# Patient Record
Sex: MALE | Race: WHITE | Employment: UNEMPLOYED | URBAN - METROPOLITAN AREA
[De-identification: names, ages, dates, MRNs, and addresses within clinical notes are randomized per-mention and may not be internally consistent; named-entity substitution may affect disease eponyms.]

---

## 2017-01-18 ENCOUNTER — TELEPHONE (OUTPATIENT)
Dept: ORTHOPEDIC SURGERY | Age: 27
End: 2017-01-18

## 2017-01-18 DIAGNOSIS — S83.512D RUPTURE OF ANTERIOR CRUCIATE LIGAMENT OF LEFT KNEE, SUBSEQUENT ENCOUNTER: Primary | ICD-10-CM

## 2017-01-18 NOTE — TELEPHONE ENCOUNTER
Please place order and send to [facility] for Custom ACL brace. Please call patient and let him know that this has been done.

## 2019-01-29 ENCOUNTER — OFFICE VISIT (OUTPATIENT)
Dept: INTERNAL MEDICINE CLINIC | Age: 29
End: 2019-01-29

## 2019-01-29 VITALS
TEMPERATURE: 97.9 F | BODY MASS INDEX: 29.52 KG/M2 | HEART RATE: 58 BPM | OXYGEN SATURATION: 97 % | DIASTOLIC BLOOD PRESSURE: 80 MMHG | SYSTOLIC BLOOD PRESSURE: 119 MMHG | HEIGHT: 74 IN | RESPIRATION RATE: 18 BRPM | WEIGHT: 230 LBS

## 2019-01-29 DIAGNOSIS — B35.1 ONYCHOMYCOSIS OF TOENAIL: Primary | ICD-10-CM

## 2019-01-29 DIAGNOSIS — H61.23 BILATERAL IMPACTED CERUMEN: ICD-10-CM

## 2019-01-29 RX ORDER — NEOMYCIN SULFATE, POLYMYXIN B SULFATE AND HYDROCORTISONE 10; 3.5; 1 MG/ML; MG/ML; [USP'U]/ML
3 SUSPENSION/ DROPS AURICULAR (OTIC) 2 TIMES DAILY
Qty: 10 ML | Refills: 1 | Status: SHIPPED | OUTPATIENT
Start: 2019-01-29

## 2019-01-29 RX ORDER — NEOMYCIN SULFATE, POLYMYXIN B SULFATE AND HYDROCORTISONE 10; 3.5; 1 MG/ML; MG/ML; [USP'U]/ML
3 SUSPENSION/ DROPS AURICULAR (OTIC) 2 TIMES DAILY
Qty: 10 ML | Refills: 1 | Status: SHIPPED | OUTPATIENT
Start: 2019-01-29 | End: 2019-01-29 | Stop reason: SDUPTHER

## 2019-01-29 NOTE — PROGRESS NOTES
Chief Complaint Patient presents with  Ear Fullness Ears full, decreased hearing. Father and grandfather with hearing loss.  Nail Problem  
  right great toenail broken, cracked 2 weeks ago and does not seem to be growing back. [employer]ays [employer], travel team.  
 
1. Have you been to the ER, urgent care clinic since your last visit? Hospitalized since your last visit? No 
 
2. Have you seen or consulted any other health care providers outside of the [address] [place] [address] since your last visit? Include any pap smears or colon screening. No  
PHQ over the last two weeks 1/29/2019 Little interest or pleasure in doing things Not at all Feeling down, depressed, irritable, or hopeless Not at all Total Score PHQ 2 0

## 2019-01-31 NOTE — PROGRESS NOTES
HISTORY OF PRESENT ILLNESS 
Kenneth Coronel is a 29 y.o. male. HPI Pt c/o bilat decreased hearing. He has a hx of cerumen impaction and feels that has happened again. Denies otalgia, tinnitus, drainage, fever, chills, HA, and dizziness. He is also concerned about his right great toenail. It split yesterday and has turned a whitish yellow. No Known Allergies Current Outpatient Medications Medication Sig  
 neomycin-polymyxin-hydrocortisone, buffered, (PEDIOTIC) 3.5-10,000-1 mg/mL-unit/mL-% otic suspension Administer 3 Drops into each ear two (2) times a day.  ibuprofen (MOTRIN) 200 mg tablet Take  by mouth.  ibuprofen (MOTRIN) 800 mg tablet Take 1 Tab by mouth three (3) times daily (with meals).  Cjbplih-Cvgennbnctl-AG-Acetam (NYQUIL D) 6.25-30- mg/15 mL liqd Take  by mouth.  phenylephrine-dm-acetaminophen (VICKS DAYQUIL) 5- mg cap Take  by mouth. No current facility-administered medications for this visit. Review of Systems Constitutional: Negative. Negative for chills, fever and malaise/fatigue. HENT: Positive for hearing loss (bilat). Negative for congestion, ear pain, sore throat and tinnitus. Eyes: Negative. Negative for blurred vision, double vision and photophobia. Respiratory: Negative. Negative for cough, shortness of breath and wheezing. Cardiovascular: Negative. Negative for chest pain, palpitations and leg swelling. Gastrointestinal: Negative. Negative for abdominal pain, heartburn, nausea and vomiting. Genitourinary: Negative. Negative for dysuria, frequency, hematuria and urgency. Musculoskeletal: Negative. Negative for back pain, joint pain, myalgias and neck pain. Skin: Negative for itching and rash. Right great toenail splitting Neurological: Negative. Negative for dizziness, tingling, tremors and headaches. Psychiatric/Behavioral: Negative.   Negative for depression and memory loss. The patient is not nervous/anxious and does not have insomnia. Visit Vitals /80 (BP 1 Location: Left arm, BP Patient Position: Sitting) Pulse (!) 58 Temp 97.9 °F (36.6 °C) (Oral) Resp 18 Ht 6' 2\" (1.88 m) Wt 230 lb (104.3 kg) SpO2 97% BMI 29.53 kg/m² Physical Exam  
Constitutional: He is oriented to person, place, and time. He appears well-developed and well-nourished. No distress. HENT:  
Head: Normocephalic and atraumatic. Right Ear: Tympanic membrane normal. There is swelling. Left Ear: Tympanic membrane normal. There is swelling. bilat impacted cerumen worse on right - TMs viewed post irrigation and intact. Canal erythematous and minimal bleeding following irrigation. Cardiovascular: Normal rate, regular rhythm and normal heart sounds. Pulmonary/Chest: Effort normal and breath sounds normal.  
Neurological: He is alert and oriented to person, place, and time. Skin: He is not diaphoretic. Right great toenail yellow and splitting Psychiatric: He has a normal mood and affect. His behavior is normal.  
 
 
ASSESSMENT and PLAN 
  ICD-10-CM ICD-9-CM 1. Onychomycosis of toenail B35.1 110.1 2. Bilateral impacted cerumen H61.23 380.4 REMOVAL IMPACTED CERUMEN IRRIGATION/LVG UNILAT  
   neomycin-polymyxin-hydrocortisone, buffered, (PEDIOTIC) 3.5-10,000-1 mg/mL-unit/mL-% otic suspension Follow-up Disposition: 
Return if symptoms worsen or fail to improve. Pt expressed understanding of visit summary and plans for any follow ups or referrals as well as any medications prescribed.

## 2019-01-31 NOTE — PATIENT INSTRUCTIONS
Earwax Blockage: Care Instructions Your Care Instructions Earwax is a natural substance that protects the ear canal. Normally, earwax drains from the ears and does not cause problems. Sometimes earwax builds up and hardens. Earwax blockage (also called cerumen impaction) can cause some loss of hearing and pain. When wax is tightly packed, you will need to have your doctor remove it. Follow-up care is a key part of your treatment and safety. Be sure to make and go to all appointments, and call your doctor if you are having problems. It's also a good idea to know your test results and keep a list of the medicines you take. How can you care for yourself at home? · Do not try to remove earwax with cotton swabs, fingers, or other objects. This can make the blockage worse and damage the eardrum. · If your doctor recommends that you try to remove earwax at home: ? Soften and loosen the earwax with warm mineral oil. You also can try hydrogen peroxide mixed with an equal amount of room temperature water. Place 2 drops of the fluid, warmed to body temperature, in the ear two times a day for up to 5 days. ? Once the wax is loose and soft, all that is usually needed to remove it from the ear canal is a gentle, warm shower. Direct the water into the ear, then tip your head to let the earwax drain out. Dry your ear thoroughly with a hair dryer set on low. Hold the dryer several inches from your ear. ? If the warm mineral oil and shower do not work, use an over-the-counter wax softener. Read and follow all instructions on the label. After using the wax softener, use an ear syringe to gently flush the ear. Make sure the flushing solution is body temperature. Cool or hot fluids in the ear can cause dizziness. When should you call for help? Call your doctor now or seek immediate medical care if: 
  · Pus or blood drains from your ear.  
  · Your ears are ringing or feel full.  
  · You have a loss of hearing.  Watch closely for changes in your health, and be sure to contact your doctor if: 
  · You have pain or reduced hearing after 1 week of home treatment.  
  · You have any new symptoms, such as nausea or balance problems. Where can you learn more? Go to http://nita-alcon.info/. Enter U975 in the search box to learn more about \"Earwax Blockage: Care Instructions. \" Current as of: September 23, 2018 Content Version: 11.9 © 3971-1170 Signature. Care instructions adapted under license by [employer] (which disclaims liability or warranty for this information). If you have questions about a medical condition or this instruction, always ask your healthcare professional. [address] any warranty or liability for your use of this information.

## 2020-08-06 ENCOUNTER — OFFICE VISIT (OUTPATIENT)
Dept: ORTHOPEDIC SURGERY | Age: 30
End: 2020-08-06

## 2020-08-06 VITALS
TEMPERATURE: 97.3 F | BODY MASS INDEX: 30.03 KG/M2 | HEART RATE: 64 BPM | RESPIRATION RATE: 18 BRPM | HEIGHT: 74 IN | DIASTOLIC BLOOD PRESSURE: 67 MMHG | WEIGHT: 234 LBS | SYSTOLIC BLOOD PRESSURE: 117 MMHG

## 2020-08-06 DIAGNOSIS — M25.562 ACUTE PAIN OF LEFT KNEE: Primary | ICD-10-CM

## 2020-08-06 DIAGNOSIS — M17.12 PRIMARY OSTEOARTHRITIS OF LEFT KNEE: ICD-10-CM

## 2020-08-06 DIAGNOSIS — S83.242A ACUTE MEDIAL MENISCUS TEAR OF LEFT KNEE, INITIAL ENCOUNTER: ICD-10-CM

## 2020-08-06 RX ORDER — ACETAMINOPHEN 325 MG/1
TABLET ORAL
COMMUNITY

## 2020-08-06 RX ORDER — TRIAMCINOLONE ACETONIDE 40 MG/ML
40 INJECTION, SUSPENSION INTRA-ARTICULAR; INTRAMUSCULAR ONCE
Qty: 1 ML | Refills: 0
Start: 2020-08-06 | End: 2020-08-06

## 2020-08-06 NOTE — PROGRESS NOTES
Rosalind Fraire  1990   Chief Complaint   Patient presents with    Knee Pain     Left knee pain        HISTORY OF PRESENT ILLNESS  Rosalind Fraire is a 27 y.o. male who presents today for evaluation of left knee pain. He is s/p medial capsule repair and ACL reconstruction in 2014. He notes 2 weeks ago he noted extreme swelling and pain in his knee. Pain limited him from walking and was very difficult to move. He has been stretching and working on mobility. Still has a catching sensation when walking. Still limping when walking. Pain is a 4/10. No relief with nsaids or tylenol. Pain Assessment  8/6/2020   Location of Pain -   Location Modifiers -   Severity of Pain 4   Quality of Pain Other (Comment)   Quality of Pain Comment shooting pain and pull   Duration of Pain Persistent   Frequency of Pain Constant   Date Pain First Started 7/23/2020   Aggravating Factors Other (Comment); Walking   Aggravating Factors Comment straighting   Limiting Behavior Yes   Relieving Factors Other (Comment)   Relieving Factors Comment slight bend in the knee   Result of Injury No            No Known Allergies     History reviewed. No pertinent past medical history. Social History     Socioeconomic History    Marital status: SINGLE     Spouse name: Not on file    Number of children: Not on file    Years of education: Not on file    Highest education level: Not on file   Occupational History    Not on file   Social Needs    Financial resource strain: Not on file    Food insecurity     Worry: Not on file     Inability: Not on file    Transportation needs     Medical: Not on file     Non-medical: Not on file   Tobacco Use    Smoking status: Never Smoker    Smokeless tobacco: Never Used   Substance and Sexual Activity    Alcohol use:  Yes     Alcohol/week: 0.0 standard drinks    Drug use: No    Sexual activity: Not on file   Lifestyle    Physical activity     Days per week: Not on file     Minutes per session: Not on file  Stress: Not on file   Relationships    Social connections     Talks on phone: Not on file     Gets together: Not on file     Attends Church service: Not on file     Active member of club or organization: Not on file     Attends meetings of clubs or organizations: Not on file     Relationship status: Not on file    Intimate partner violence     Fear of current or ex partner: Not on file     Emotionally abused: Not on file     Physically abused: Not on file     Forced sexual activity: Not on file   Other Topics Concern    Not on file   Social History Narrative    Not on file      Past Surgical History:   Procedure Laterality Date    HX KNEE ARTHROSCOPY        Family History   Problem Relation Age of Onset    No Known Problems Mother     No Known Problems Father     No Known Problems Brother       Current Outpatient Medications   Medication Sig    acetaminophen (TylenoL) 325 mg tablet Take  by mouth every four (4) hours as needed for Pain.  triamcinolone acetonide (Kenalog) 40 mg/mL injection 1 mL by Intra artICUlar route once for 1 dose.  neomycin-polymyxin-hydrocortisone, buffered, (PEDIOTIC) 3.5-10,000-1 mg/mL-unit/mL-% otic suspension Administer 3 Drops into each ear two (2) times a day.  ibuprofen (MOTRIN) 800 mg tablet Take 1 Tab by mouth three (3) times daily (with meals).  Onjklgw-Rturmduugaj-RN-Acetam (NYQUIL D) 6.25-30- mg/15 mL liqd Take  by mouth.  phenylephrine-dm-acetaminophen (VICKS DAYQUIL) 5- mg cap Take  by mouth.  ibuprofen (MOTRIN) 200 mg tablet Take  by mouth. No current facility-administered medications for this visit. REVIEW OF SYSTEM   Patient denies: Weight loss, Fever/Chills, HA, Visual changes, Fatigue, Chest pain, SOB, Abdominal pain, N/V/D/C, Blood in stool or urine, Edema. Pertinent positive as above in HPI.  All others were negative    PHYSICAL EXAM:   Visit Vitals  /67 (BP 1 Location: Right arm, BP Patient Position: Sitting) Pulse 64   Temp 97.3 °F (36.3 °C)   Resp 18   Ht 6' 2\" (1.88 m)   Wt 234 lb (106.1 kg)   BMI 30.04 kg/m²     The patient is a well-developed, well-nourished male   in no acute distress. The patient is alert and oriented times three. The patient is alert and oriented times three. Mood and affect are normal.  LYMPHATIC: lymph nodes are not enlarged and are within normal limits  SKIN: normal in color and non tender to palpation. There are no bruises or abrasions noted. NEUROLOGICAL: Motor sensory exam is within normal limits. Reflexes are equal bilaterally. There is normal sensation to pinprick and light touch  MUSCULOSKELETAL:  Examination Left knee   Skin Intact   Range of motion 5-115   Effusion +   Medial joint line tenderness +   Lateral joint line tenderness -   Tenderness Pes Bursa -   Tenderness insertion MCL -   Tenderness insertion LCL -   Roms +   Patella crepitus -   Patella grind -   Lachman -   Pivot shift -   Anterior drawer -   Posterior drawer -   Varus stress -   Valgus stress -   Neurovascular Intact   Calf Swelling and Tenderness to Palpation -   Mecca's Test -   Hamstring Cord Tightness +       IMAGIN view xray images of left knee taken in office on 2020 read and reviewed by myself reveal degenerative changes in medial and patella femoral compartment. ACL tunnel with anchor in place    PROCEDURE: Left knee Injection    Indication:Left knee pain/swelling    After sterile prep, 4 cc of Xylocaine and 1 cc of Kenalog were injected into the left knee.        [facility] - [facility]  OFFICE PROCEDURE PROGRESS NOTE        Chart reviewed for the following:  Gaye REID PA, have reviewed the History, Physical and updated the Allergic reactions for [address] performed immediately prior to start of procedure:  Gaye REID PA-C, have performed the following reviews on Rosalind Fraire prior to the start of the procedure:            * Patient was identified by name and date of birth   * Agreement on procedure being performed was verified  * Risks and Benefits explained to the patient  * Procedure site verified and marked as necessary  * Patient was positioned for comfort  * Consent was signed and verified     Time: 10:41 AM    Date of procedure: 8/6/2020    Procedure performed by:  NIKO Fierro    Provider assisted by: (see medication administration)    How tolerated by patient: tolerated the procedure well with no complications    Comments: none        IMPRESSION:      ICD-10-CM ICD-9-CM    1. Acute pain of left knee  M25.562 719.46 AMB POC XRAY, KNEE; 1/2 VIEWS   2. Primary osteoarthritis of left knee  M17.12 715.16 TRIAMCINOLONE ACETONIDE INJ      triamcinolone acetonide (Kenalog) 40 mg/mL injection      DRAIN/INJECT LARGE JOINT/BURSA   3. Acute medial meniscus tear of left knee, initial encounter  S83.242A 836.0 MRI KNEE LT WO CONT        PLAN:   1. Patient with acute knee pain. Ddx: degen arthritis acute exacerbation vs MMT. Will inject today   Risk factors include: n/a  2. Yes cortisone injection indicated today leftknee  3. No Physical/Occupational Therapy indicated today  4. Yes diagnostic test indicated today: MRI left knee r/o MMT  5. No durable medical equipment indicated today  6. No referral indicated today   7. No medications indicated today:   8.  No Narcotic indicated today      RTC after MRI     [facility] [address] [facility]

## 2020-08-11 ENCOUNTER — DOCUMENTATION ONLY (OUTPATIENT)
Dept: ORTHOPEDIC SURGERY | Age: 30
End: 2020-08-11

## 2020-08-11 NOTE — PROGRESS NOTES
Patients order for MRI was re-faxed to [facility] diagnostic again today 08/11/20. I called and advised facility I had faxed it for the 2nd time, they stated they will go look for it on the fax machine [phone] and call patient.

## 2020-08-17 DIAGNOSIS — S83.242A ACUTE MEDIAL MENISCUS TEAR OF LEFT KNEE, INITIAL ENCOUNTER: ICD-10-CM

## 2020-08-19 ENCOUNTER — OFFICE VISIT (OUTPATIENT)
Dept: ORTHOPEDIC SURGERY | Age: 30
End: 2020-08-19

## 2020-08-19 VITALS
DIASTOLIC BLOOD PRESSURE: 69 MMHG | SYSTOLIC BLOOD PRESSURE: 119 MMHG | TEMPERATURE: 98.6 F | HEART RATE: 78 BPM | HEIGHT: 74 IN | BODY MASS INDEX: 29.57 KG/M2 | WEIGHT: 230.4 LBS | RESPIRATION RATE: 14 BRPM | OXYGEN SATURATION: 97 %

## 2020-08-19 DIAGNOSIS — S83.242D ACUTE MEDIAL MENISCUS TEAR OF LEFT KNEE, SUBSEQUENT ENCOUNTER: Primary | ICD-10-CM

## 2020-08-19 NOTE — PROGRESS NOTES
Billie Morgan  1990   Chief Complaint   Patient presents with    Knee Pain     Left        HISTORY OF PRESENT ILLNESS  Billie Morgan is a 27 y.o. male who presents today for evaluation of left knee and MRI review. He is s/p medial capsule repair and ACL reconstruction in 2014. Pt rates pain as 2/10 today. He notes that almost 4 weeks ago he noted extreme swelling and pain in his knee. Pain limited him from walking and was very difficult to move. At last OV, patient had a cortisone injection which provided some relief. He was able to walk without a limp around 1 week after the injection. He is now able to stretch his hamstrings. Started bearing weight while exercising yesterday. His pain has overall improved but still notes some pain with jumping motions such as with playing volleyball. Has tried taking NSAIDs and Tylenol. Patient denies any fever, chills, chest pain, shortness of breath or calf pain. The remainder of the review of systems is negative. There are no new illness or injuries to report since last seen in the office. There are no changes to medications, allergies, family or social history. Pain Assessment  8/19/2020   Location of Pain Knee   Location Modifiers Left   Severity of Pain 2   Quality of Pain Aching; Sharp   Quality of Pain Comment -   Duration of Pain Persistent   Frequency of Pain Constant   Date Pain First Started -   Aggravating Factors Other (Comment); Squatting   Aggravating Factors Comment jumping   Limiting Behavior No   Relieving Factors Exercises;Ice;Rest   Relieving Factors Comment -   Result of Injury No            No Known Allergies     History reviewed. No pertinent past medical history.    Social History     Socioeconomic History    Marital status: SINGLE     Spouse name: Not on file    Number of children: Not on file    Years of education: Not on file    Highest education level: Not on file   Occupational History    Not on file   Social Needs    Financial resource strain: Not on file    Food insecurity     Worry: Not on file     Inability: Not on file    Transportation needs     Medical: Not on file     Non-medical: Not on file   Tobacco Use    Smoking status: Never Smoker    Smokeless tobacco: Never Used   Substance and Sexual Activity    Alcohol use: Yes     Alcohol/week: 0.0 standard drinks    Drug use: No    Sexual activity: Not on file   Lifestyle    Physical activity     Days per week: Not on file     Minutes per session: Not on file    Stress: Not on file   Relationships    Social connections     Talks on phone: Not on file     Gets together: Not on file     Attends Jain service: Not on file     Active member of club or organization: Not on file     Attends meetings of clubs or organizations: Not on file     Relationship status: Not on file    Intimate partner violence     Fear of current or ex partner: Not on file     Emotionally abused: Not on file     Physically abused: Not on file     Forced sexual activity: Not on file   Other Topics Concern    Not on file   Social History Narrative    Not on file      Past Surgical History:   Procedure Laterality Date    HX KNEE ARTHROSCOPY        Family History   Problem Relation Age of Onset    No Known Problems Mother     No Known Problems Father     No Known Problems Brother       Current Outpatient Medications   Medication Sig    acetaminophen (TylenoL) 325 mg tablet Take  by mouth every four (4) hours as needed for Pain.  neomycin-polymyxin-hydrocortisone, buffered, (PEDIOTIC) 3.5-10,000-1 mg/mL-unit/mL-% otic suspension Administer 3 Drops into each ear two (2) times a day.  ibuprofen (MOTRIN) 800 mg tablet Take 1 Tab by mouth three (3) times daily (with meals).  Zkaxcoh-Dkrrpvrmvyo-AW-Acetam (NYQUIL D) 6.25-30- mg/15 mL liqd Take  by mouth.  phenylephrine-dm-acetaminophen (VICKS DAYQUIL) 5- mg cap Take  by mouth.  ibuprofen (MOTRIN) 200 mg tablet Take  by mouth. No current facility-administered medications for this visit. REVIEW OF SYSTEM   Patient denies: Weight loss, Fever/Chills, HA, Visual changes, Fatigue, Chest pain, SOB, Abdominal pain, N/V/D/C, Blood in stool or urine, Edema. Pertinent positive as above in HPI. All others were negative    PHYSICAL EXAM:   Visit Vitals  /69 (BP 1 Location: Right arm)   Pulse 78   Temp 98.6 °F (37 °C) (Temporal)   Resp 14   Ht 6' 2\" (1.88 m)   Wt 230 lb 6.4 oz (104.5 kg)   SpO2 97%   BMI 29.58 kg/m²     The patient is a well-developed, well-nourished male   in no acute distress. The patient is alert and oriented times three. The patient is alert and oriented times three. Mood and affect are normal.  LYMPHATIC: lymph nodes are not enlarged and are within normal limits  SKIN: normal in color and non tender to palpation. There are no bruises or abrasions noted. NEUROLOGICAL: Motor sensory exam is within normal limits. Reflexes are equal bilaterally. There is normal sensation to pinprick and light touch  MUSCULOSKELETAL:  Examination Left knee   Skin Intact   Range of motion 5-115   Effusion +   Medial joint line tenderness +   Lateral joint line tenderness -   Tenderness Pes Bursa -   Tenderness insertion MCL -   Tenderness insertion LCL -   Roms +   Patella crepitus -   Patella grind -   Lachman -   Pivot shift -   Anterior drawer -   Posterior drawer -   Varus stress -   Valgus stress -   Neurovascular Intact   Calf Swelling and Tenderness to Palpation -   Mecca's Test -   Hamstring Cord Tightness +       IMAGING: MRI of left knee dated 8/16/2020 was reviewed and read by Dr. Ravi Boggs:   IMPRESSION:  1. Small oblique radial tear at the posterior horn medial meniscus, which appears new compared to left knee MRI dated 12/20/2016. Separate small horizontal tear extending to the interior surface at the posterior horn medial meniscus, unchanged.  Mildly progressive mild to moderate medial compartment osteoarthritis with medial femoral condyle delaminating articular cartilage tear. 2. Postsurgical change at the posterior horn lateral meniscus. No lateral meniscus tear is identified. Mildly progressive mild to moderate lateral compartment osteoarthritis. 3. Mildly progressive mild to moderate patellofemoral compartment osteoarthritis with small patellar delaminating articular cartilage tears. 4. Intact ACL graft. 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 5. Mild semi-membranosus tendinosis and mild semi-membranosus bursitis  6. Intact MCL/medial meniscal capsular junction repair. 7. Small joint effusion      2 view xray images of left knee taken in office on 8/06/2020 read and reviewed by Caryle Aus, PA, reveal: degenerative changes in medial and patella femoral compartment. ACL tunnel with anchor in place        IMPRESSION:      ICD-10-CM ICD-9-CM    1. Acute medial meniscus tear of left knee, subsequent encounter  S83.242D V58.89      836.0         PLAN:   1. Patient presents with an MRI-documented left knee medial meniscus tear. He states his pain has improved since last OV and has been able to resume with most of his activities. I advised him to use pain as his guide and he can return as needed. Risk factors include: n/a  2. No cortisone injection indicated today   3. No Physical/Occupational Therapy indicated today  4. No diagnostic test indicated today  5. No durable medical equipment indicated today  6. No referral indicated today   7. No medications indicated today:   8. No Narcotic indicated today      RTC prn      Scribed by [address]) as dictated by Di Landeros MD    I, Dr. Di Landeros, confirm that all documentation is accurate.     Di Landeros M.D.   [facility]

## 2020-08-28 ENCOUNTER — APPOINTMENT (OUTPATIENT)
Dept: PHYSICAL THERAPY | Age: 30
End: 2020-08-28

## 2020-10-30 ENCOUNTER — HOSPITAL ENCOUNTER (OUTPATIENT)
Dept: PHYSICAL THERAPY | Age: 30
Discharge: HOME | End: 2020-10-30
Payer: COMMERCIAL

## 2020-10-30 PROCEDURE — 97161 PT EVAL LOW COMPLEX 20 MIN: CPT | Performed by: PHYSICAL THERAPIST

## 2020-10-30 PROCEDURE — 97140 MANUAL THERAPY 1/> REGIONS: CPT | Performed by: PHYSICAL THERAPIST

## 2020-10-30 NOTE — PROGRESS NOTES
[address] [facility] PHYSICAL THERAPY  [address] [place] [address] [address],[place], [address] [facility] [address] - Phone: [phone]  Fax: [phone] / [phone] [facility]  Patient Name: Abril Hunt : 1990   Medical   Diagnosis: L knee arthroscopy Treatment Diagnosis: Left knee pain [M25.562]   Onset Date: 10/27/20     Referral Source: [facility] [place], operated by [facility]): 10/30/2020   Prior Hospitalization: See medical history Provider #: [id number]   Prior Level of Function: Pain with recreational activities   Comorbidities: L ACL/MMR/LMR (), arthritis, alcohol use   Medications: Verified on Patient Summary List   The Plan of Care and following information is based on the information from the initial evaluation.   ===========================================================================================  Assessment / key information:  27 M arrives to clinic s/p L knee arthroscopy. Pt had a previous ACL/MMR/LMR 7 years ago but reports inability to obtain full extension and return to full recreational actities. Currently 1-9/10, ambulates with stiff legged gait pattern but without AD or progressive pain, arom -5-88. Incisions normal healing rate post op. +gastroc/hs tightness, reduced quad, glute strength.  Will attempt PT in order to address problem list below  ===========================================================================================  Eval Complexity: History MEDIUM  Complexity : 1-2 comorbidities / personal factors will impact the outcome/ POC ;  Examination  MEDIUM Complexity : 3 Standardized tests and measures addressing body structure, function, activity limitation and / or participation in recreation ; Presentation LOW Complexity : Stable, uncomplicated ;  Decision Making MEDIUM Complexity : FOTO score of 26-74; Overall Complexity LOW   Problem List: pain affecting function, decrease ROM, decrease strength, edema affecting function, impaired gait/ balance, decrease ADL/ functional abilitiies, decrease activity tolerance, decrease flexibility/ joint mobility and decrease transfer abilities   Treatment Plan may include any combination of the following: Therapeutic exercise, Therapeutic activities, Neuromuscular re-education, Physical agent/modality, Gait/balance training, Manual therapy, Patient education, Self Care training, Functional mobility training, Home safety training and Stair training  Patient / Family readiness to learn indicated by: asking questions  Persons(s) to be included in education: patient (P)  Barriers to Learning/Limitations: None  Measures taken, if barriers to learning:    Patient Goal (s): Improve mobility   Patient self reported health status: good  Rehabilitation Potential: good   Short Term Goals: To be accomplished in  2  weeks:  1. Compliant with hep  2. Increase knee arom >110 to A with squatting  3. Note daily max pain </=6/10 in order to increase participation in  [place]: To be accomplished in  4  weeks:  1. Pt will obtain full knee extension in order to normalize gait pattern  2. Increase FOTO by 20 points in order to show functional improvement  3. Note daily max pain </=4/10 in order to increase participation   Frequency / Duration:   Patient to be seen  2  times per week for 4  weeks:  Patient / Caregiver education and instruction: self care, activity modification and exercises  Therapist Signature: Kp Noland PT Date: 05/40/4310   Certification Period: na Time: 8:20 AM   ===========================================================================================  I certify that the above Physical Therapy Services are being furnished while the patient is under my care. I agree with the treatment plan and certify that this therapy is necessary.     Physician Signature:        Date:       Time:     Please sign and return to [facility] at [facility] [place], [place], [facility]. or you may fax the signed copy to [phone]. Thank you.

## 2020-10-30 NOTE — PROGRESS NOTES
[facility] PHYSICAL THERAPY - DAILY TREATMENT NOTE    Patient Name: Cee Quesada        Date: 10/30/2020  : 1990   yes Patient  Verified  Visit #:     Insurance: Payor: Rosa Gan / Plan: [address] PT / Product Type: Commerical /      In time: 730 Out time: 815   Total Treatment Time: 45     Medicare/BCBS Time Tracking (below)   Total Timed Codes (min):  na 1:1 Treatment Time:  na     TREATMENT AREA =  Left knee pain [M25.562]    SUBJECTIVE  Pain Level (on 0 to 10 scale):  0  / 10   Medication Changes/New allergies or changes in medical history, any new surgeries or procedures?    no  If yes, update Summary List   Subjective Functional Status/Changes:  []  No changes reported     See ie          OBJECTIVE  Modalities Rationale:     decrease inflammation, decrease pain and increase tissue extensibility to improve patient's ability to complete adls   min [] Estim, type/location:                                      []  att     []  unatt     []  w/US     []  w/ice    []  w/heat    min []  Mechanical Traction: type/lbs                   []  pro   []  sup   []  int   []  cont    []  before manual    []  after manual    min []  Ultrasound, settings/location:      min []  Iontophoresis w/ dexamethasone, location:                                               []  take home patch       []  in clinic   10 min [x]  Ice     []  Heat    location/position: Supine with bolster     min []  Vasopneumatic Device, press/temp:     min []  Other:    [x] Skin assessment post-treatment (if applicable):    [x]  intact    []  redness- no adverse reaction     []redness  adverse reaction:        10 min Therapeutic Exercise:  [x]  See flow sheet   Rationale:      increase ROM and increase strength to improve the patients ability to complete adl     15 min Manual Therapy: Knee prom, op, pat mobs   Rationale:      decrease pain, increase ROM, increase tissue extensibility and decrease trigger points to improve patient's ability to complete adls  The manual therapy interventions were performed at a separate and distinct time from the therapeutic activities interventions. Billed With/As:   [] TE   [] TA   [] Neuro   [] Self Care Patient Education: [x] Review HEP    [] Progressed/Changed HEP based on:   [] positioning   [] body mechanics   [] transfers   [] heat/ice application    [] other:      Other Objective/Functional Measures:    No increase in pain following session  See ie     Post Treatment Pain Level (on 0 to 10) scale:   0  / 10     ASSESSMENT  Assessment/Changes in Function:     See ie     []  See Progress Note/Recertification   Patient will continue to benefit from skilled PT services to modify and progress therapeutic interventions, address functional mobility deficits, address ROM deficits, address strength deficits, analyze and address soft tissue restrictions, analyze and cue movement patterns, analyze and modify body mechanics/ergonomics, assess and modify postural abnormalities, address imbalance/dizziness and instruct in home and community integration to attain remaining goals. Progress toward goals / Updated goals:    See ie     PLAN  []  Upgrade activities as tolerated yes Continue plan of care   []  Discharge due to :    []  Other:      Therapist: Alvaro Quiroz PT    Date: 10/30/2020 Time: 8:12 AM     No future appointments.

## 2020-11-03 ENCOUNTER — HOSPITAL ENCOUNTER (OUTPATIENT)
Dept: PHYSICAL THERAPY | Age: 30
Discharge: HOME | End: 2020-11-03
Payer: COMMERCIAL

## 2020-11-03 PROCEDURE — 97110 THERAPEUTIC EXERCISES: CPT | Performed by: PHYSICAL THERAPIST

## 2020-11-03 PROCEDURE — 97140 MANUAL THERAPY 1/> REGIONS: CPT | Performed by: PHYSICAL THERAPIST

## 2020-11-03 NOTE — PROGRESS NOTES
Alayna Doyle PHYSICAL THERAPY - DAILY TREATMENT NOTE    Patient Name: Reta Nagy        Date: 11/3/2020  : 1990   yes Patient  Verified  Visit #:     Insurance: Payor: Kenna Mail / Plan: [address] PT / Product Type: Commerical /      In time: 1010 Out time: 1120   Total Treatment Time: 65     Medicare/BCBS Time Tracking (below)   Total Timed Codes (min):  na 1:1 Treatment Time:  na     TREATMENT AREA =  Left knee pain [M25.562]    SUBJECTIVE  Pain Level (on 0 to 10 scale):  0  / 10   Medication Changes/New allergies or changes in medical history, any new surgeries or procedures?    no  If yes, update Summary List   Subjective Functional Status/Changes:  []  No changes reported     I do have some discomfort on the inside part of my knee.            OBJECTIVE  Modalities Rationale:     decrease inflammation, decrease pain and increase tissue extensibility to improve patient's ability to complete adls    min [] Estim, type/location:                                      []  att     []  unatt     []  w/US     []  w/ice    []  w/heat    min []  Mechanical Traction: type/lbs                   []  pro   []  sup   []  int   []  cont    []  before manual    []  after manual    min []  Ultrasound, settings/location:      min []  Iontophoresis w/ dexamethasone, location:                                               []  take home patch       []  in clinic   10 min [x]  Ice     []  Heat    location/position: Supine with bolster     min []  Vasopneumatic Device, press/temp:     min []  Other:    [x] Skin assessment post-treatment (if applicable):    [x]  intact    []  redness- no adverse reaction     []redness  adverse reaction:        40 min Therapeutic Exercise:  [x]  See flow sheet   Rationale:      increase ROM and increase strength to improve the patients ability to complete adls     15 min Manual Therapy: Post talus mobs, knee prom, quard release    Rationale:      decrease pain, increase ROM, increase tissue extensibility and decrease trigger points to improve patient's ability to complete adls  The manual therapy interventions were performed at a separate and distinct time from the therapeutic activities interventions. Billed With/As:   [] TE   [] TA   [] Neuro   [] Self Care Patient Education: [x] Review HEP    [] Progressed/Changed HEP based on:   [] positioning   [] body mechanics   [] transfers   [] heat/ice application    [] other:      Other Objective/Functional Measures:    Reduced post talus mob during end range df likely contributing to early heel off during squats  Performed te as per flow sheet with increased time required to complete to ensure form - large hurdles with hip hike during reciprocal portion     Post Treatment Pain Level (on 0 to 10) scale:   3  / 10     ASSESSMENT  Assessment/Changes in Function:     Increased in knee discomfort following initial session     []  See Progress Note/Recertification   Patient will continue to benefit from skilled PT services to modify and progress therapeutic interventions, address functional mobility deficits, address ROM deficits, address strength deficits, analyze and address soft tissue restrictions, analyze and cue movement patterns, analyze and modify body mechanics/ergonomics, assess and modify postural abnormalities, address imbalance/dizziness and instruct in home and community integration to attain remaining goals.    Progress toward goals / Updated goals:    Compliant with hep     PLAN  []  Upgrade activities as tolerated yes Continue plan of care   []  Discharge due to :    []  Other:      Therapist: Santi Toney PT    Date: 11/3/2020 Time: 8:29 AM     Future Appointments   Date Time Provider Aayush Troy   11/3/2020 10:15 AM NUSRAT [id number]   11/4/2020  2:15 PM Malika Jordan [facility]   11/10/2020 10:15 [facility]   11/13/2020 10:15 AM NUSRAT Millan [facility]   11/17/2020 10:15 AM Kristen Lamas, [facility]   11/20/2020 10:15 AM Radha Gant, PT [facility]

## 2020-11-04 ENCOUNTER — HOSPITAL ENCOUNTER (OUTPATIENT)
Dept: PHYSICAL THERAPY | Age: 30
Discharge: HOME | End: 2020-11-04
Payer: COMMERCIAL

## 2020-11-04 PROCEDURE — 97110 THERAPEUTIC EXERCISES: CPT | Performed by: PHYSICAL THERAPIST

## 2020-11-04 PROCEDURE — 97140 MANUAL THERAPY 1/> REGIONS: CPT | Performed by: PHYSICAL THERAPIST

## 2020-11-04 NOTE — PROGRESS NOTES
bAbi Cohn   PHYSICAL THERAPY - DAILY TREATMENT NOTE    Patient Name: Mimi Glaser        Date: 2020  : 1990   yes Patient  Verified  Visit #:   3   of   12  Insurance: Payor: Amna Daniels / Plan: [address] / Product Type: Commerical /      In time: 213 Out time: 308   Total Treatment Time: 53     Medicare/BCBS Time Tracking (below)   Total Timed Codes (min):  na 1:1 Treatment Time:  na     TREATMENT AREA =  Left knee pain [M25.562]    SUBJECTIVE  Pain Level (on 0 to 10 scale):  0  / 10   Medication Changes/New allergies or changes in medical history, any new surgeries or procedures?    no  If yes, update Summary List   Subjective Functional Status/Changes:  []  No changes reported       I felt fine after last time no soreness        OBJECTIVE  Modalities Rationale:     decrease inflammation, decrease pain and increase tissue extensibility to improve patient's ability to complete adls    min [] Estim, type/location:                                      []  att     []  unatt     []  w/US     []  w/ice    []  w/heat    min []  Mechanical Traction: type/lbs                   []  pro   []  sup   []  int   []  cont    []  before manual    []  after manual    min []  Ultrasound, settings/location:      min []  Iontophoresis w/ dexamethasone, location:                                               []  take home patch       []  in clinic   10 min [x]  Ice     []  Heat    location/position: Supine with bolster     min []  Vasopneumatic Device, press/temp:     min []  Other:    [x] Skin assessment post-treatment (if applicable):    [x]  intact    []  redness- no adverse reaction     []redness  adverse reaction:        28 min Therapeutic Exercise:  [x]  See flow sheet   Rationale:      increase ROM and increase strength to improve the patients ability to complete adls     15 min Manual Therapy: Post talus mobs, knee prom, quard release    Rationale:      decrease pain, increase ROM, increase tissue extensibility and decrease trigger points to improve patient's ability to complete adls  The manual therapy interventions were performed at a separate and distinct time from the therapeutic activities interventions. Billed With/As:   [] TE   [] TA   [] Neuro   [] Self Care Patient Education: [x] Review HEP    [] Progressed/Changed HEP based on:   [] positioning   [] body mechanics   [] transfers   [] heat/ice application    [] other:      Other Objective/Functional Measures:  Performed quad based te as per flow sheet - able to get to parallel with wall sits pain free, RDL required stiff legged to avoid hip compensation   No tenderness at inferior joint line        Post Treatment Pain Level (on 0 to 10) scale:   0 / 10     ASSESSMENT  Assessment/Changes in Function:   Pt arom flexion limited by suture vs knee rom/pain       []  See Progress Note/Recertification   Patient will continue to benefit from skilled PT services to modify and progress therapeutic interventions, address functional mobility deficits, address ROM deficits, address strength deficits, analyze and address soft tissue restrictions, analyze and cue movement patterns, analyze and modify body mechanics/ergonomics, assess and modify postural abnormalities, address imbalance/dizziness and instruct in home and community integration to attain remaining goals.    Progress toward goals / Updated goals:  Pt to have sutures removed prior to next visit - anticipate significant improvement in rom following session        PLAN  []  Upgrade activities as tolerated yes Continue plan of care   []  Discharge due to :    []  Other:      Therapist: Ray Kimble PT    Date: 11/4/2020 Time: 8:29 AM     Future Appointments   Date Time Provider Ayaush Troy   11/4/2020  2:15 PM Emerald Collins PT [facility]   11/10/2020 10:15 AM Emerald Collins PT [facility]   11/13/2020 10:15 AM Emerald Collins PT [facility]   11/17/2020 10:15 AM Barbara Gant PT [id number] 11/20/2020 10:15 AM Kush Gant, PT [id number]

## 2020-11-10 ENCOUNTER — HOSPITAL ENCOUNTER (OUTPATIENT)
Dept: PHYSICAL THERAPY | Age: 30
Discharge: HOME | End: 2020-11-10
Payer: COMMERCIAL

## 2020-11-10 PROCEDURE — 97140 MANUAL THERAPY 1/> REGIONS: CPT | Performed by: PHYSICAL THERAPIST

## 2020-11-10 PROCEDURE — 97110 THERAPEUTIC EXERCISES: CPT | Performed by: PHYSICAL THERAPIST

## 2020-11-10 NOTE — PROGRESS NOTES
Ivania Wu   PHYSICAL THERAPY - DAILY TREATMENT NOTE    Patient Name: Mona Grimm        Date: 11/10/2020  : 1990   yes Patient  Verified  Visit #:     Insurance: Payor: Von Mustapha / Plan: [address] PT / Product Type: Commerical /      In time: 8854 Out time: 286   Total Treatment Time: 70     Medicare/[facility] Time Tracking (below)   Total Timed Codes (min):  na 1:1 Treatment Time:  na     TREATMENT AREA =  Left knee pain [M25.562]    SUBJECTIVE  Pain Level (on 0 to 10 scale):  0  / 10   Medication Changes/New allergies or changes in medical history, any new surgeries or procedures?    no  If yes, update Summary List   Subjective Functional Status/Changes:  []  No changes reported   I saw the surgeon they took out the stiches and said let the strips stay in until they just fall off on there own, told me to just stick with PT until I follow up with them in december        OBJECTIVE  Modalities Rationale:     decrease inflammation, decrease pain and increase tissue extensibility to improve patient's ability to complete adls    min [] Estim, type/location:                                      []  att     []  unatt     []  w/US     []  w/ice    []  w/heat    min []  Mechanical Traction: type/lbs                   []  pro   []  sup   []  int   []  cont    []  before manual    []  after manual    min []  Ultrasound, settings/location:      min []  Iontophoresis w/ dexamethasone, location:                                               []  take home patch       []  in clinic   10 min [x]  Ice     []  Heat    location/position: Supine with bolster     min []  Vasopneumatic Device, press/temp:     min []  Other:    [x] Skin assessment post-treatment (if applicable):    [x]  intact    []  redness- no adverse reaction     []redness  adverse reaction:        45 min Therapeutic Exercise:  [x]  See flow sheet   Rationale:      increase ROM and increase strength to improve the patients ability to complete adls 15 min Manual Therapy: Post talus mobs, knee prom, quard release    Rationale:      decrease pain, increase ROM, increase tissue extensibility and decrease trigger points to improve patient's ability to complete adls  The manual therapy interventions were performed at a separate and distinct time from the therapeutic activities interventions. Billed With/As:   [] TE   [] TA   [] Neuro   [] Self Care Patient Education: [x] Review HEP    [] Progressed/Changed HEP based on:   [] positioning   [] body mechanics   [] transfers   [] heat/ice application    [] other:      Other Objective/Functional Measures:  Advanced te as per flow sheet wit emphasis on lower body pull exercises and proprioception   Knee flexion prom 100 with noted lateral joint line discomfort at end range     Post Treatment Pain Level (on 0 to 10) scale:   3 / 10     ASSESSMENT  Assessment/Changes in Function:     Reported increase in mm soreness throughout proximal hip mm and quad which he reported as pain     []  See Progress Note/Recertification   Patient will continue to benefit from skilled PT services to modify and progress therapeutic interventions, address functional mobility deficits, address ROM deficits, address strength deficits, analyze and address soft tissue restrictions, analyze and cue movement patterns, analyze and modify body mechanics/ergonomics, assess and modify postural abnormalities, address imbalance/dizziness and instruct in home and community integration to attain remaining goals.    Progress toward goals / Updated goals:  Progressing with rom      PLAN  []  Upgrade activities as tolerated yes Continue plan of care   []  Discharge due to :    []  Other:      Therapist: Syl Alva PT    Date: 11/10/2020 Time: 8:29 AM     Future Appointments   Date Time Provider Aayush Troy   11/10/2020 10:15 AM Kandyce Gowers, PT  [facility]   11/13/2020 10:15 AM Kandyce Gowers, PT [facility]   11/17/2020 10:15 AM Alfreda [facility]azan Gonzalez,  [facility]   11/20/2020 10:15 AM [id number]

## 2020-11-13 ENCOUNTER — APPOINTMENT (OUTPATIENT)
Dept: PHYSICAL THERAPY | Age: 30
End: 2020-11-13
Payer: COMMERCIAL

## 2020-11-17 ENCOUNTER — HOSPITAL ENCOUNTER (OUTPATIENT)
Dept: PHYSICAL THERAPY | Age: 30
Discharge: HOME | End: 2020-11-17
Payer: COMMERCIAL

## 2020-11-17 PROCEDURE — 97140 MANUAL THERAPY 1/> REGIONS: CPT | Performed by: PHYSICAL THERAPIST

## 2020-11-17 PROCEDURE — 97110 THERAPEUTIC EXERCISES: CPT | Performed by: PHYSICAL THERAPIST

## 2020-11-17 NOTE — PROGRESS NOTES
Corrine Vieyra PHYSICAL THERAPY - DAILY TREATMENT NOTE    Patient Name: Scottie Bruce        Date: 2020  : 1990   yes Patient  Verified  Visit #:   5 of   12  Insurance: Payor: Candie Dyson / Plan: [address] PT / Product Type: Commerical /      In time: 7292 Out time: 1115   Total Treatment Time: 57     Medicare/[facility] Time Tracking (below)   Total Timed Codes (min):  na 1:1 Treatment Time:  na     TREATMENT AREA =  Left knee pain [M25.562]    SUBJECTIVE  Pain Level (on 0 to 10 scale):  0  / 10   Medication Changes/New allergies or changes in medical history, any new surgeries or procedures?    no  If yes, update Summary List   Subjective Functional Status/Changes:  []  No changes reported     Biked 10 miles so I had some increased soreness along the inside part of my knee but I iced it and took some ibuprofen and I was fine     OBJECTIVE      42 min Therapeutic Exercise:  [x]  See flow sheet   Rationale:      increase ROM and increase strength to improve the patients ability to complete adls     15 min Manual Therapy: knee prom, quad release and prone stretch, cfm incision   Rationale:      decrease pain, increase ROM, increase tissue extensibility and decrease trigger points to improve patient's ability to complete adls  The manual therapy interventions were performed at a separate and distinct time from the therapeutic activities interventions.       Billed With/As:   [] TE   [] TA   [] Neuro   [] Self Care Patient Education: [x] Review HEP    [] Progressed/Changed HEP based on:   [] positioning   [] body mechanics   [] transfers   [] heat/ice application    [] other:      Other Objective/Functional Measures:    ttp along lateral incision  Limited L posterior ilial rotation and psoas tightness limiting end range hip flexion/extension and thus squatting, TKE in step ups with joint line discomfort but no pain   Post Treatment Pain Level (on 0 to 10) scale:   1-2 / 10     ASSESSMENT  Assessment/Changes in Function:     Hip mobility impacting squats/triple extension mechanics     []  See Progress Note/Recertification   Patient will continue to benefit from skilled PT services to modify and progress therapeutic interventions, address functional mobility deficits, address ROM deficits, address strength deficits, analyze and address soft tissue restrictions, analyze and cue movement patterns, analyze and modify body mechanics/ergonomics, assess and modify postural abnormalities, address imbalance/dizziness and instruct in home and community integration to attain remaining goals.    Progress toward goals / Updated goals:  FOTO next session in order to assess progress towards goal     PLAN  []  Upgrade activities as tolerated yes Continue plan of care   []  Discharge due to :    []  Other:      Therapist: Regine Davison, PT    Date: 11/17/2020 Time: 8:29 AM     Future Appointments   Date Time Provider Aayush Troy   11/17/2020 10:15 AM Gabriel Crowley, PT [facility]   11/20/2020 10:15 AM Mariel Gant, PT [facility]

## 2020-11-20 ENCOUNTER — HOSPITAL ENCOUNTER (OUTPATIENT)
Dept: PHYSICAL THERAPY | Age: 30
Discharge: HOME | End: 2020-11-20
Payer: COMMERCIAL

## 2020-11-20 PROCEDURE — 97140 MANUAL THERAPY 1/> REGIONS: CPT | Performed by: PHYSICAL THERAPIST

## 2020-11-20 PROCEDURE — 97110 THERAPEUTIC EXERCISES: CPT | Performed by: PHYSICAL THERAPIST

## 2020-11-20 NOTE — PROGRESS NOTES
[facility] - DAILY TREATMENT NOTE    Patient Name: Nicolasa Vela        Date: 2020  : 1990   yes Patient  Verified  Visit #:     Insurance: Payor: Omar Saldaña / Plan: [address] PT / Product Type: Commerical /      In time: 419 Out time: 1100   Total Treatment Time: 45     Medicare/[facility] Time Tracking (below)   Total Timed Codes (min):  na 1:1 Treatment Time:  na     TREATMENT AREA =  Left knee pain [M25.562]    SUBJECTIVE  Pain Level (on 0 to 10 scale):  0  / 10   Medication Changes/New allergies or changes in medical history, any new surgeries or procedures?    no  If yes, update Summary List   Subjective Functional Status/Changes:  []  No changes reported     I cannot tell if my knee bending is getting any better      OBJECTIVE      30 min Therapeutic Exercise:  [x]  See flow sheet   Rationale:      increase ROM and increase strength to improve the patients ability to complete adls     15 min Manual Therapy: knee prom, quad release and prone stretch, cfm incision   Rationale:      decrease pain, increase ROM, increase tissue extensibility and decrease trigger points to improve patient's ability to complete adls  The manual therapy interventions were performed at a separate and distinct time from the therapeutic activities interventions. Billed With/As:   [] TE   [] TA   [] Neuro   [] Self Care Patient Education: [x] Review HEP    [] Progressed/Changed HEP based on:   [] positioning   [] body mechanics   [] transfers   [] heat/ice application    [] other:      Other Objective/Functional Measures:  Hip flexion combined with knee flexion reduces mobility to approximately 100 degrees.  Emphasized the need for hip mobility in addition to knee in order to squat      Post Treatment Pain Level (on 0 to 10) scale:   1-2 / 10     ASSESSMENT  Assessment/Changes in Function:   > parallel squatting limiting by hip mobility     []  See Progress Note/Recertification   Patient will continue to benefit from skilled PT services to modify and progress therapeutic interventions, address functional mobility deficits, address ROM deficits, address strength deficits, analyze and address soft tissue restrictions, analyze and cue movement patterns, analyze and modify body mechanics/ergonomics, assess and modify postural abnormalities, address imbalance/dizziness and instruct in home and community integration to attain remaining goals.    Progress toward goals / Updated goals:  Gains towarsd all ltg     PLAN  []  Upgrade activities as tolerated yes Continue plan of care   []  Discharge due to :    []  Other:      Therapist: Shelton Moser, PT    Date: 11/20/2020 Time: 8:29 AM     Future Appointments   Date Time Provider Aayush Troy   11/20/2020 10:15 AM Lauren Gant, PT [id number]

## 2020-12-11 ENCOUNTER — HOSPITAL ENCOUNTER (OUTPATIENT)
Dept: PHYSICAL THERAPY | Age: 30
Discharge: HOME | End: 2020-12-11
Payer: COMMERCIAL

## 2020-12-11 PROCEDURE — 97110 THERAPEUTIC EXERCISES: CPT | Performed by: PHYSICAL THERAPIST

## 2020-12-11 PROCEDURE — 97140 MANUAL THERAPY 1/> REGIONS: CPT | Performed by: PHYSICAL THERAPIST

## 2020-12-11 NOTE — PROGRESS NOTES
[facility] - DAILY TREATMENT NOTE    Patient Name: Felton Hollingsworth        Date: 2020  : 1990   yes Patient  Verified  Visit #:     Insurance: Payor: Emily Rivera / [facility] / Product Type: Commerical /      In time: 746 Out time: 758   Total Treatment Time: 43     Medicare/[facility] Time Tracking (below)   Total Timed Codes (min):  na 1:1 Treatment Time:  na     TREATMENT AREA =  Left knee pain [M25.562]    SUBJECTIVE  Pain Level (on 0 to 10 scale):  0  / 10   Medication Changes/New allergies or changes in medical history, any new surgeries or procedures?    no  If yes, update Summary List   Subjective Functional Status/Changes:  []  No changes reported     See pn      OBJECTIVE      30 min Therapeutic Exercise:  [x]  See flow sheet   Rationale:      increase ROM and increase strength to improve the patients ability to complete adls     13 min Manual Therapy: knee prom, quad release and prone stretch, cfm incision   Rationale:      decrease pain, increase ROM, increase tissue extensibility and decrease trigger points to improve patient's ability to complete adls  The manual therapy interventions were performed at a separate and distinct time from the therapeutic activities interventions.       Billed With/As:   [] TE   [] TA   [] Neuro   [] Self Care Patient Education: [x] Review HEP    [] Progressed/Changed HEP based on:   [] positioning   [] body mechanics   [] transfers   [] heat/ice application    [] other:      Other Objective/Functional Measures:  See pn      Post Treatment Pain Level (on 0 to 10) scale:   1-2 / 10     ASSESSMENT  Assessment/Changes in Function:   See pn      []  See Progress Note/Recertification   Patient will continue to benefit from skilled PT services to modify and progress therapeutic interventions, address functional mobility deficits, address ROM deficits, address strength deficits, analyze and address soft tissue restrictions, analyze and cue movement patterns, analyze and modify body mechanics/ergonomics, assess and modify postural abnormalities, address imbalance/dizziness and instruct in home and community integration to attain remaining goals.    Progress toward goals / Updated goals:  See pn      PLAN  []  Upgrade activities as tolerated yes Continue plan of care   []  Discharge due to :    []  Other:      Therapist: Kraig Monson PT    Date: 12/11/2020 Time: 8:29 AM     Future Appointments   Date Time Provider Aayush Troy   12/18/2020  7:15 AM Ishan Gant, PT [facility]

## 2020-12-11 NOTE — PROGRESS NOTES
[facility]  [address] [facility] [address],[facility], [facility] - Phone: [phone]  Fax: [phone]  PROGRESS NOTE  Patient Name: Genesis Choi : 1990   Treatment/Medical Diagnosis: Left knee pain [M25.562]   Referral Source: Michael Barrios*     Date of Initial Visit: 10/30/20 Attended Visits: 7 Missed Visits: 0     SUMMARY OF TREATMENT  Pt was seen for IE and 6 f/u visits with treatment consisting of therapeutic exercises for LE rom/strengthening, manual therapy (prom/mobs/stm) and instruction on hep/activity modification  CURRENT STATUS  Pt has made good progress with PT thus far. He had 2 weeks of pain following his own exercises progression. Now that this has improved we have resumed strengthening program. He is able to squat to squat to parallel but requires cues for medial/latearl control. Have yet to progress to jumping/running until improved eccentric control. Would like to continue with therapy 1x3-4 weeks to progress this with anticipation of d/c at that time. Goal/Measure of Progress Goal Met? 1. Pt will obtain full knee extension in order to normalize gait pattern   Status at last Eval: -5 Current Status: 0 yes   2. Pt will increase FOTO by 20 points in order to show functional improvement   Status at last Eval:  Current Status: Pt did not complete n/a   3. Pt will note daily max pain </=4/10 in order to participation in adls   Status at last Eval: 9/10 Current Status: 2/10 yes     New Goals to be achieved in __4__  treatments:  1. Achieve goal #1  2. Pt will be able to perform 12\" box jumps pain free in order to return to sport  3. I with advanced hep in order to prepare for dc  RECOMMENDATIONS  Continue therapy an additional 4 treatments to progress strength  If you have any questions/comments please contact us directly at [phone].    Thank you for allowing us to assist in the care of your patient. Therapist Signature: Chetan Short PT Date: 12/11/2020     Time: 8:36 AM   NOTE TO PHYSICIAN:  PLEASE COMPLETE THE ORDERS BELOW AND FAX TO   [facility] Physical Therapy: [phone]  If you are unable to process this request in 24 hours please contact our office: [phone]    ___ I have read the above report and request that my patient continue as recommended.   ___ I have read the above report and request that my patient continue therapy with the following changes/special instructions:_________________________________________________________   ___ I have read the above report and request that my patient be discharged from therapy.      Physician Signature:        Date:       Time:

## 2020-12-18 ENCOUNTER — HOSPITAL ENCOUNTER (OUTPATIENT)
Dept: PHYSICAL THERAPY | Age: 30
Discharge: HOME | End: 2020-12-18
Payer: COMMERCIAL

## 2020-12-18 PROCEDURE — 97110 THERAPEUTIC EXERCISES: CPT | Performed by: PHYSICAL THERAPIST

## 2020-12-18 PROCEDURE — 97140 MANUAL THERAPY 1/> REGIONS: CPT | Performed by: PHYSICAL THERAPIST

## 2020-12-18 NOTE — PROGRESS NOTES
[facility] - DAILY TREATMENT NOTE    Patient Name: Froy Headings        Date: 2020  : 1990   yes Patient  Verified  Visit #:     Insurance: Payor: Saint Satchel / Plan: [address] / Product Type: Commerical /      In time: 968 Out time: 056   Total Treatment Time: 40     Medicare/[facility] Time Tracking (below)   Total Timed Codes (min):  na 1:1 Treatment Time:  na     TREATMENT AREA =  Left knee pain [M25.562]    SUBJECTIVE  Pain Level (on 0 to 10 scale):  0  / 10   Medication Changes/New allergies or changes in medical history, any new surgeries or procedures?    no  If yes, update Summary List   Subjective Functional Status/Changes:  []  No changes reported     I only have some pressure on the inside part of my knee and its pretty much with any exercise but no swelling and it does not limit me     OBJECTIVE      10 min Therapeutic Exercise:  [x]  See flow sheet   Rationale:      increase ROM and increase strength to improve the patients ability to complete adls     30 min Manual Therapy: knee prom, quad release and prone stretch, cfm incision, psoas release and stretch, post talus mob, great toe ext   Rationale:      decrease pain, increase ROM, increase tissue extensibility and decrease trigger points to improve patient's ability to complete adls  The manual therapy interventions were performed at a separate and distinct time from the therapeutic activities interventions.       Billed With/As:   [] TE   [] TA   [] Neuro   [] Self Care Patient Education: [x] Review HEP    [] Progressed/Changed HEP based on:   [] positioning   [] body mechanics   [] transfers   [] heat/ice application    [] other:      Other Objective/Functional Measures:  Performed t/l sidelying rotat with 10kg 10x5, rfess 3x12, front foot elevated split squat 3x10 requiring visual cues to avoid trunk rotation and knee control  ttp along post tib insertion with reduced hallux ext limiting push off      Post Treatment Pain Level (on 0 to 10) scale:   0-/ 10     ASSESSMENT  Assessment/Changes in Function:   Address proximal and distal joints limiting squat positions     []  See Progress Note/Recertification   Patient will continue to benefit from skilled PT services to modify and progress therapeutic interventions, address functional mobility deficits, address ROM deficits, address strength deficits, analyze and address soft tissue restrictions, analyze and cue movement patterns, analyze and modify body mechanics/ergonomics, assess and modify postural abnormalities, address imbalance/dizziness and instruct in home and community integration to attain remaining goals. Progress toward goals / Updated goals:  Progress towards goal     PLAN  []  Upgrade activities as tolerated yes Continue plan of care   []  Discharge due to :    []  Other:      Therapist: William Galindo PT    Date: 12/18/2020 Time: 8:29 AM     No future appointments.

## 2021-01-13 ENCOUNTER — HOSPITAL ENCOUNTER (OUTPATIENT)
Dept: PHYSICAL THERAPY | Age: 31
Discharge: HOME | End: 2021-01-13
Payer: COMMERCIAL

## 2021-01-13 PROCEDURE — 97140 MANUAL THERAPY 1/> REGIONS: CPT | Performed by: PHYSICAL THERAPIST

## 2021-01-13 PROCEDURE — 97110 THERAPEUTIC EXERCISES: CPT | Performed by: PHYSICAL THERAPIST

## 2021-01-13 NOTE — PROGRESS NOTES
[facility]  [address] [place] [address], [facility] [address],[facility], 70 [facility] - Phone: [phone]  Fax: [phone]  PROGRESS NOTE  Patient Name: Daja Moseley : 1990   Treatment/Medical Diagnosis: Left knee pain [M25.562]   Referral Source: Jaclynlavrenetoro Louissilvina*     Date of Initial Visit: 10/30/20 Attended Visits: 7 Missed Visits: 0     SUMMARY OF TREATMENT  Pt was seen for IE and 6 f/u visits with treatment consisting of therapeutic exercises for LE rom/strengthening, manual therapy (prom/mobs/stm) and instruction on hep/activity modification  CURRENT STATUS  Due to scheduling and holidays pt has not been seen in a month. During that time he was able to maintain compliance with his push/pull strengthening program. He still continues to report posterior lateral and anterior medial knee pain with terminal knee extension and reduced eccentric loading of knoee. Goal/Measure of Progress Goal Met? 1. Pt will be able to perform 12\" box jumps pain free in order to return to sport   Status at last Eval: na Current Status: 1/10 pain  progressing   2. Pt will increase FOTO by 20 points in order to show functional improvement   Status at last Eval:  Current Status: Pt did not complete n/a   3. Pt will be I with advanced hep in order to prepare for dc    Status at last Eval: na Current Status: Still yet to return to plyos progressing     New Goals to be achieved in __4__  treatments:  Continue as above  RECOMMENDATIONS  Continue therapy an additional 4 treatments to progress strength  If you have any questions/comments please contact us directly at [phone]. Thank you for allowing us to assist in the care of your patient.     Therapist Signature: Jim Pardo PT Date: 2021     Time: 8:36 AM   NOTE TO PHYSICIAN:  PLEASE COMPLETE THE ORDERS BELOW AND FAX TO   [facility] Physical Therapy: [phone]  If you are unable to process this request in 24 hours please contact our office: [phone]    ___ I have read the above report and request that my patient continue as recommended.   ___ I have read the above report and request that my patient continue therapy with the following changes/special instructions:_________________________________________________________   ___ I have read the above report and request that my patient be discharged from therapy.      Physician Signature:        Date:       Time:

## 2021-01-13 NOTE — PROGRESS NOTES
[facility] - DAILY TREATMENT NOTE    Patient Name: Shelia Sensor        Date: 2021  : 1990   yes Patient  Verified  Visit #:     Insurance: Payor: Alfredo Cabrera / Plan: [address] [place] [address] Rd PT / Product Type: Commerical /      In time: 508 Out time: 600   Total Treatment Time: 52     Medicare/[facility] Time Tracking (below)   Total Timed Codes (min):  na 1:1 Treatment Time:  na     TREATMENT AREA =  Left knee pain [M25.562]    SUBJECTIVE  Pain Level (on 0 to 10 scale):  0  / 10   Medication Changes/New allergies or changes in medical history, any new surgeries or procedures?    no  If yes, update Summary List   Subjective Functional Status/Changes:  []  No changes reported   See pn        OBJECTIVE      22 min Therapeutic Exercise:  [x]  See flow sheet   Rationale:      increase ROM and increase strength to improve the patients ability to complete adls     30 min Manual Therapy: knee prom, quad release and prone stretch, cfm incision, psoas release and stretch, post talus mob, great toe ext   Rationale:      decrease pain, increase ROM, increase tissue extensibility and decrease trigger points to improve patient's ability to complete adls  The manual therapy interventions were performed at a separate and distinct time from the therapeutic activities interventions.       Billed With/As:   [] TE   [] TA   [] Neuro   [] Self Care Patient Education: [x] Review HEP    [] Progressed/Changed HEP based on:   [] positioning   [] body mechanics   [] transfers   [] heat/ice application    [] other:      Other Objective/Functional Measures:    See pn    Post Treatment Pain Level (on 0 to 10) scale:   0-/ 10     ASSESSMENT  Assessment/Changes in Function:   See pn      []  See Progress Note/Recertification   Patient will continue to benefit from skilled PT services to modify and progress therapeutic interventions, address functional mobility deficits, address ROM deficits, address strength deficits, analyze and address soft tissue restrictions, analyze and cue movement patterns, analyze and modify body mechanics/ergonomics, assess and modify postural abnormalities, address imbalance/dizziness and instruct in home and community integration to attain remaining goals.    Progress toward goals / Updated goals:  See pn      PLAN  []  Upgrade activities as tolerated yes Continue plan of care   []  Discharge due to :    []  Other:      Therapist: Sherie Robb PT    Date: 1/13/2021 Time: 8:29 AM     Future Appointments   Date Time Provider Aayush Troy   1/13/2021  5:15 PM Tiffanie Gant,  [address] [facility]

## 2021-01-25 ENCOUNTER — HOSPITAL ENCOUNTER (OUTPATIENT)
Dept: PHYSICAL THERAPY | Age: 31
Discharge: HOME | End: 2021-01-25
Payer: COMMERCIAL

## 2021-01-25 PROCEDURE — 97140 MANUAL THERAPY 1/> REGIONS: CPT | Performed by: PHYSICAL THERAPIST

## 2021-01-25 NOTE — PROGRESS NOTES
[facility] - DAILY TREATMENT NOTE    Patient Name: Nichelle Shell        Date: 2021  : 1990   yes Patient  Verified  Visit #:   10  of   12  Insurance: Payor: Patrick Heath / Plan: [address] PT / Product Type: Commerical /      In time: 600 Out time: 625   Total Treatment Time: 25     Medicare/[facility] Time Tracking (below)   Total Timed Codes (min):  na 1:1 Treatment Time:  na     TREATMENT AREA =  Left knee pain [M25.562]    SUBJECTIVE  Pain Level (on 0 to 10 scale):  0  / 10   Medication Changes/New allergies or changes in medical history, any new surgeries or procedures?    no  If yes, update Summary List   Subjective Functional Status/Changes:  []  No changes reported   See dc       OBJECTIVE        25 min Manual Therapy: knee prom, quad release and prone stretch, cfm incision, psoas release and stretch, post talus mob, great toe ext   Rationale:      decrease pain, increase ROM, increase tissue extensibility and decrease trigger points to improve patient's ability to complete adls  The manual therapy interventions were performed at a separate and distinct time from the therapeutic activities interventions.       Billed With/As:   [] TE   [] TA   [] Neuro   [] Self Care Patient Education: [x] Review HEP    [] Progressed/Changed HEP based on:   [] positioning   [] body mechanics   [] transfers   [] heat/ice application    [] other:      Other Objective/Functional Measures:    See dc   Post Treatment Pain Level (on 0 to 10) scale:   0-/ 10     ASSESSMENT  Assessment/Changes in Function:   See dc   []  See Progress Note/Recertification   Patient will continue to benefit from skilled PT services to see dc   Progress toward goals / Updated goals:  See dc     PLAN  []  Upgrade activities as tolerated no Continue plan of care   []  Discharge due to : Goals achieved    []  Other:      Therapist: Henrique Elizabeth PT    Date: 2021 Time: 8:29 AM     Future Appointments   Date Time Provider Aayush Troy   1/25/2021  6:00 PM Bib Davidson [facility]   1/27/2021  2:15 PM David Gant, PT [facility]

## 2021-01-27 ENCOUNTER — APPOINTMENT (OUTPATIENT)
Dept: PHYSICAL THERAPY | Age: 31
End: 2021-01-27
Payer: COMMERCIAL

## 2021-04-05 NOTE — PROGRESS NOTES
[facility]  [address] [place] [address],[facility], 70 [facility] - Phone: [phone]  Fax: [phone]  DISCHARGE NOTE  Patient Name: Shelia Hunt : 1990   Treatment/Medical Diagnosis: Left knee pain [M25.562]   Referral Source: Hanny Garcia*     Date of Initial Visit: 10/30/20 Attended Visits: 10 Missed Visits: 0     SUMMARY OF TREATMENT  Pt was seen for IE and 9 f/u visits with treatment consisting of therapeutic exercises for LE rom/strengthening, manual therapy (prom/mobs/stm) and instruction on hep/activity modification  CURRENT STATUS  Pt has returned to sports pain free. Goal/Measure of Progress Goal Met? 1. Pt will be able to perform 12\" box jumps pain free in order to return to sport   Status at last Eval: 1/10 pain Current Status: Single leg  yes   2. Pt will increase FOTO by 20 points in order to show functional improvement   Status at last Eval:  Current Status: Pt did not complete n/a   3. Pt will be I with advanced hep in order to prepare for dc    Status at last Eval: Cues still required  Current Status:  yes       RECOMMENDATIONS  D/c due to goals achieved   If you have any questions/comments please contact us directly at [phone]. Thank you for allowing us to assist in the care of your patient.     Therapist Signature: Winter Dimas PT Date: 2021     Time: 8:36 AM   NOTE TO PHYSICIAN:  PLEASE COMPLETE THE ORDERS BELOW AND FAX TO   [facility]: [phone]  If you are unable to process this request in 24 hours please contact our office: [phone]    ___ I have read the above report and request that my patient continue as recommended.   ___ I have read the above report and request that my patient continue therapy with the following changes/special instructions:_________________________________________________________   ___ I have read the above report and request that my patient be discharged from therapy.      Physician Signature:        Date:       Time: